# Patient Record
Sex: FEMALE | Race: WHITE | URBAN - METROPOLITAN AREA
[De-identification: names, ages, dates, MRNs, and addresses within clinical notes are randomized per-mention and may not be internally consistent; named-entity substitution may affect disease eponyms.]

---

## 2021-06-04 ENCOUNTER — WEB ENCOUNTER (OUTPATIENT)
Dept: URBAN - METROPOLITAN AREA CLINIC 40 | Facility: CLINIC | Age: 61
End: 2021-06-04

## 2021-06-04 ENCOUNTER — DASHBOARD ENCOUNTERS (OUTPATIENT)
Age: 61
End: 2021-06-04

## 2021-06-04 ENCOUNTER — OFFICE VISIT (OUTPATIENT)
Dept: URBAN - METROPOLITAN AREA CLINIC 40 | Facility: CLINIC | Age: 61
End: 2021-06-04
Payer: COMMERCIAL

## 2021-06-04 VITALS
BODY MASS INDEX: 20.89 KG/M2 | WEIGHT: 130 LBS | HEART RATE: 95 BPM | HEIGHT: 66 IN | TEMPERATURE: 98.1 F | SYSTOLIC BLOOD PRESSURE: 110 MMHG | DIASTOLIC BLOOD PRESSURE: 70 MMHG

## 2021-06-04 DIAGNOSIS — Z12.11 ENCOUNTER FOR SCREENING COLONOSCOPY: ICD-10-CM

## 2021-06-04 DIAGNOSIS — Z86.19 HISTORY OF HEPATITIS C: ICD-10-CM

## 2021-06-04 PROCEDURE — 99203 OFFICE O/P NEW LOW 30 MIN: CPT | Performed by: PHYSICIAN ASSISTANT

## 2021-06-04 RX ORDER — MELOXICAM 15 MG/1
1 TABLET TABLET ORAL ONCE A DAY
Status: ACTIVE | COMMUNITY

## 2021-06-04 RX ORDER — CYCLOBENZAPRINE HYDROCHLORIDE 10 MG/1
1 TABLET AT BEDTIME AS NEEDED TABLET, FILM COATED ORAL ONCE A DAY
Status: ACTIVE | COMMUNITY

## 2021-06-04 RX ORDER — SODIUM, POTASSIUM,MAG SULFATES 17.5-3.13G
ONCE SOLUTION, RECONSTITUTED, ORAL ORAL
Qty: 1 KIT | Refills: 0 | OUTPATIENT
Start: 2021-06-04 | End: 2021-06-05

## 2021-06-04 RX ORDER — ATORVASTATIN CALCIUM 20 MG/1
1 TABLET TABLET, FILM COATED ORAL ONCE A DAY
Status: ACTIVE | COMMUNITY

## 2021-06-04 RX ORDER — TRAMADOL HYDROCHLORIDE 50 MG/1
1 TABLET AS NEEDED TABLET, FILM COATED ORAL ONCE A DAY
Status: ACTIVE | COMMUNITY

## 2021-06-04 RX ORDER — LEVOTHYROXINE SODIUM 0.1 MG/1
1 TABLET IN THE MORNING ON AN EMPTY STOMACH TABLET ORAL ONCE A DAY
Status: ACTIVE | COMMUNITY

## 2021-06-04 NOTE — PHYSICAL EXAM CONSTITUTIONAL:
well developed, thin, in no acute distress , ambulating without difficulty , normal communication ability

## 2021-06-04 NOTE — HPI-TODAY'S VISIT:
Ms. Elliott is a 60-year-old white female who presents to the office today to schedule screening colonoscopy.  States that she has a past history of hepatitis C, checked many years ago and then treated by Dr. Oh with both interferon and ribavirin with successful eradication.  She did not have follow-up testing to confirm sustained viral response.  Overall, she has no GI complaints.  Admits occasional loose stool but admits also that she has been eating more fast food and sugary drinks which may be contributing to this.  Denies rectal bleeding.  No significant nausea, vomiting or dysphagia.  Some heartburn.  Chronic pain.  Takes tramadol as needed.  Denies use of NSAIDs with rare use of prescribed meloxicam.  Rare alcohol.  Smokes at least a pack a day for many years but is trying to cut back.  of six years.

## 2021-07-12 ENCOUNTER — OFFICE VISIT (OUTPATIENT)
Dept: URBAN - METROPOLITAN AREA SURGERY CENTER 30 | Facility: SURGERY CENTER | Age: 61
End: 2021-07-12
Payer: COMMERCIAL

## 2021-07-12 ENCOUNTER — CLAIMS CREATED FROM THE CLAIM WINDOW (OUTPATIENT)
Dept: URBAN - METROPOLITAN AREA CLINIC 4 | Facility: CLINIC | Age: 61
End: 2021-07-12
Payer: COMMERCIAL

## 2021-07-12 DIAGNOSIS — Z12.11 COLON CANCER SCREENING: ICD-10-CM

## 2021-07-12 DIAGNOSIS — K63.89 POLYP OF ILEUM: ICD-10-CM

## 2021-07-12 DIAGNOSIS — K62.1 DYSPLASTIC POLYP OF RECTUM: ICD-10-CM

## 2021-07-12 PROCEDURE — G8907 PT DOC NO EVENTS ON DISCHARG: HCPCS | Performed by: INTERNAL MEDICINE

## 2021-07-12 PROCEDURE — 45380 COLONOSCOPY AND BIOPSY: CPT | Performed by: INTERNAL MEDICINE

## 2021-07-12 PROCEDURE — 88305 TISSUE EXAM BY PATHOLOGIST: CPT | Performed by: PATHOLOGY
